# Patient Record
(demographics unavailable — no encounter records)

---

## 2025-04-07 NOTE — DISCUSSION/SUMMARY
[FreeTextEntry1] : 15 year old male presents to clinic with c/o right hand swelling removed two tight bracelets from arm - sony on skin noticed encouraged student not to put the tight bracelets on arm, and not to sleep/shower with other 2 bracelets to prevent further swelling or injury to hand  [facility] form reviewed - no MH issues/concerns identified  return to clinic as needed

## 2025-04-07 NOTE — PHYSICAL EXAM
[NL] : no acute distress, alert [Moves All Extremities x 4] : moves all extremities x4 [Warm, Well Perfused x4] : warm, well perfused x4 [Capillary Refill <2s] : capillary refill < 2s [de-identified] : + swelling to right hand, active ROM, painless

## 2025-04-07 NOTE — RISK ASSESSMENT
[Eats meals with family] : eats meals with family [Has family members/adults to turn to for help] : has family members/adults to turn to for help [Is permitted and is able to make independent decisions] : Is permitted and is able to make independent decisions [Normal Performance] : normal performance [Eats regular meals including adequate fruits and vegetables] : eats regular meals including adequate fruits and vegetables [Calcium source] : calcium source [Has concerns about body or appearance] : does not have concerns about body or appearance [Has friends] : has friends [At least 1 hour of physical activity a day] : at least 1 hour of physical activity a day [Screen time (except homework) less than 2 hours a day] : no screen time (except homework) less than 2 hours a day [Has interests/participates in community activities/volunteers] : does not have interests/participates in community activities/volunteers [Uses tobacco] : does not use tobacco [Uses drugs] : does not use drugs  [Drinks alcohol] : does not drink alcohol [Home is free of violence] : home is free of violence [Uses safety belts/safety equipment] : uses safety belts/safety equipment  [Impaired/distracted driving] : no impaired/distracted driving [Has peer relationships free of violence] : has peer relationships free of violence [Has/had oral sex] : has not had oral sex [Has had sexual intercourse] : has not had sexual intercourse [Has ways to cope with stress] : has ways to cope with stress [Displays self-confidence] : displays self-confidence [Has problems with sleep] : does not have problems with sleep [Gets depressed, anxious, or irritable/has mood swings] : does not get depressed, anxious, or irritable/has mood swings [Has thought about hurting self or considered suicide] : has not thought about hurting self or considered suicide [With Teen] : teen [de-identified] : lives with mom, dad, brother, and grandmother [de-identified] : 10th grade [employer] [de-identified] : virgin, interested in females only. has girlfriend x 1 year, not interested in having sexual relations at this time [de-identified] : lisa with stress by listening to music

## 2025-04-07 NOTE — REVIEW OF SYSTEMS
[Elbow Swelling] : no swelling of the elbow [Wrist Swelling] : no swelling of the wrist [Finger Pain] : no pain in the finger [Finger Swelling] : swelling of the finger [Localized Swelling] : localized swelling [Negative] : Constitutional

## 2025-04-07 NOTE — HISTORY OF PRESENT ILLNESS
[FreeTextEntry6] : 15 year old male presents to clinic with c/o swelling to right hand student states he noticed pins and needles sensation in right hand while walking to school denies any trauma to arm or hand states he looked at hand while in class and saw it was swollen student has 4 bracelets on right hand x 1 week, one bracelet tight to skin, two bracelets loose and moveable, one bracelet slightly tight on wrist (sleeps and showers with bracelets)